# Patient Record
Sex: FEMALE | Race: WHITE | Employment: STUDENT | URBAN - METROPOLITAN AREA
[De-identification: names, ages, dates, MRNs, and addresses within clinical notes are randomized per-mention and may not be internally consistent; named-entity substitution may affect disease eponyms.]

---

## 2017-08-14 ENCOUNTER — TELEPHONE (OUTPATIENT)
Dept: OPTOMETRY | Facility: CLINIC | Age: 22
End: 2017-08-14

## 2017-08-14 NOTE — TELEPHONE ENCOUNTER
Call to verify contacts   Record #: [id number]  Rx Brand of contacts: Acuvue oasys 12 pk  QTY: 1   Right eye power numbers: 8.4 14.0 -2.50  Rx brand of contacts: Acuvue oasys 12 pk  QTY: 1box  Left eye power numbers: 8.4 14.0 -2.75  Fax number: [phone]  Contact info: Tom Prasnath [phone]  Date the prescription was written: 8/5/15  The expiration date the provider entered for that RX: 8/5/17  The provider that wrote the RX: Jose Barrera OD  Pt Address verified as matched   Fax verification denial on August 14, 2017  at 9:07 AM  Melinda KENNEDY [facility]

## 2017-08-28 ENCOUNTER — TELEPHONE (OUTPATIENT)
Dept: OPTOMETRY | Facility: CLINIC | Age: 22
End: 2017-08-28

## 2017-08-28 NOTE — TELEPHONE ENCOUNTER
Call to verify contacts   Record #: [id number]  Rx Brand of contacts: Acuvue oasys  QTY: 1 box  Right eye power numbers: 8.4 14.0 -2.75  Rx brand of contacts: Acuvue oasys  QTY: 1 box  Left eye power numbers: 8.4 14.0 -2.50  Fax number: [phone]  Contact info:   Date the prescription was written: 8/5/2015  The expiration date the provider entered for that RX: 8/5/2017  The provider that wrote the RX: Jose Barrera OD  Pt Address verified as matched   Fax verification on August 28, 2017  at 7:34 AM  Melinda KENNEDY [facility]

## 2018-12-12 ENCOUNTER — OFFICE VISIT (OUTPATIENT)
Dept: OPTOMETRY | Facility: CLINIC | Age: 23
End: 2018-12-12
Payer: COMMERCIAL

## 2018-12-12 DIAGNOSIS — H52.13 MYOPIA OF BOTH EYES: Primary | ICD-10-CM

## 2018-12-12 PROCEDURE — 92004 COMPRE OPH EXAM NEW PT 1/>: CPT | Performed by: OPTOMETRIST

## 2018-12-12 PROCEDURE — 92310 CONTACT LENS FITTING OU: CPT | Performed by: OPTOMETRIST

## 2018-12-12 PROCEDURE — 92015 DETERMINE REFRACTIVE STATE: CPT | Performed by: OPTOMETRIST

## 2018-12-12 RX ORDER — DEXTROAMPHETAMINE SACCHARATE, AMPHETAMINE ASPARTATE MONOHYDRATE, DEXTROAMPHETAMINE SULFATE AND AMPHETAMINE SULFATE 5; 5; 5; 5 MG/1; MG/1; MG/1; MG/1
20 CAPSULE, EXTENDED RELEASE ORAL DAILY
COMMUNITY

## 2018-12-12 ASSESSMENT — EXTERNAL EXAM - RIGHT EYE: OD_EXAM: NORMAL

## 2018-12-12 ASSESSMENT — SLIT LAMP EXAM - LIDS
COMMENTS: NORMAL
COMMENTS: NORMAL

## 2018-12-12 ASSESSMENT — REFRACTION_MANIFEST
OD_SPHERE: -2.50
OS_SPHERE: -3.25

## 2018-12-12 ASSESSMENT — KERATOMETRY
OS_AXISANGLE_DEGREES: 105
OD_AXISANGLE2_DEGREES: 005
OS_AXISANGLE2_DEGREES: 015
OD_AXISANGLE_DEGREES: 095
OD_K2POWER_DIOPTERS: 44.00
OD_K1POWER_DIOPTERS: 42.62
METHOD_AUTO_MANUAL: AUTOMATED
OS_K2POWER_DIOPTERS: 44.62
OS_K1POWER_DIOPTERS: 42.37

## 2018-12-12 ASSESSMENT — CUP TO DISC RATIO
OS_RATIO: 0.2
OD_RATIO: 0.2

## 2018-12-12 ASSESSMENT — TONOMETRY
OD_IOP_MMHG: 20
OS_IOP_MMHG: 20
IOP_METHOD: TONOPEN

## 2018-12-12 ASSESSMENT — REFRACTION_CURRENTRX
OS_DIAMETER: 14.0
OS_SPHERE: -2.75
OS_BRAND: ACUVUE OASYS
OD_DIAMETER: 14.0
OS_BASECURVE: 8.40
OD_BASECURVE: 8.40
OD_SPHERE: -2.50
OD_BRAND: ACUVUE OASYS

## 2018-12-12 ASSESSMENT — VISUAL ACUITY
METHOD: SNELLEN - LINEAR
CORRECTION_TYPE: CONTACTS
OD_SC: 20/100
OS_CC: 20/25
OS_CC: 20/25
OS_SC: 20/80
OD_CC: 20/20
OD_CC: 20/20
OS_CC+: -3

## 2018-12-12 ASSESSMENT — EXTERNAL EXAM - LEFT EYE: OS_EXAM: NORMAL

## 2018-12-12 ASSESSMENT — REFRACTION_WEARINGRX
OS_SPHERE: -2.75
OD_SPHERE: -2.50
OS_CYLINDER: SPHERE
OD_CYLINDER: SPHERE

## 2018-12-12 ASSESSMENT — CONF VISUAL FIELD
OD_NORMAL: 1
METHOD: COUNTING FINGERS
OS_NORMAL: 1

## 2018-12-12 NOTE — PROGRESS NOTES
"Chief Complaint   Patient presents with     Annual Eye Exam        Previous contact lens wearer? Yes: Eliud ramsey  - changes every 2 weeks  Comfort of contact lenses :\"for the most part\" in the winter eyes are dry  Satisfied with current lenses: Yes        Last Eye Exam: 2015  Dilated Previously: Yes    What are you currently using to see?  glasses and contacts    Distance Vision Acuity: Satisfied with vision    Near Vision Acuity: Satisfied with vision while reading  with glasses/contacts    Eye Comfort: dry and redness especially in the winter  Do you use eye drops? 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       OBJECTIVE: See Ophthalmology exam    ASSESSMENT:    ICD-10-CM    1. Myopia of both eyes H52.13       PLAN:     Patient Instructions   Eyeglass prescription given.    Dispensed trial contacts.  Ok to order if satisfied.    Return in 1 year for a complete eye exam or sooner if needed.    Jose Barrera, OD                       "

## 2018-12-12 NOTE — PATIENT INSTRUCTIONS
Eyeglass prescription given.    Dispensed trial contacts.  Ok to order if satisfied.    Return in 1 year for a complete eye exam or sooner if needed.    Jose Barrera, JONATAN    The affects of the dilating drops last for 4- 6 hours.  You will be more sensitive to light and vision will be blurry up close.  Mydriatic sunglasses were given if needed.      Optometry Providers       Clinic Locations                                 Telephone Number   Dr. Magui Mejia [facility] [place]   [place] [phone]     [facility] Hours:                [place] [facility] Hours:       [place] [facility] Hours:   [address]   [address]      [address] [place] [address]   [place] [address]    [place] [address]  Phone: [phone]                    Phone: [phone]     Phone: [phone]                      Monday 8:00-7:00                          Monday 8:00-7:00                          Monday 8:00-7:00              Tuesday 8:00-6:00                          Tuesday 8:00-7:00                          Tuesday 8:00-7:00              Wednesday 8:00-6:00                  Wednesday 8:00-7:00                   Wednesday 8:00-7:00      Thursday 8:00-6:00                        Thursday 8:00-7:00                         Thursday 8:00-7:00            Friday 8:00-5:00                              Friday 8:00-5:00                              Friday 8:00-5:00    [facility] Hours:   [address] [place] [address]  [phone]    Monday 8:00-7:00  Tuesday 8:00-7:00  Wednesday 8:00-7:00  Thursday 8:00-7:00  Friday 8:00-5:00  Please log on to [email or web address] to order your contact lenses.  The link is found on the [facility] page.  As always, Thank you for trusting us with your health care needs!

## 2019-01-07 ENCOUNTER — TELEPHONE (OUTPATIENT)
Dept: OPHTHALMOLOGY | Facility: CLINIC | Age: 24
End: 2019-01-07

## 2019-01-07 NOTE — TELEPHONE ENCOUNTER
Call to verify contacts   Record #: [id number]  Rx Brand of contacts: Acuvue oasys   QTY: 1 12 pk  Right eye power numbers: 8.4 14.0  -2.50  Rx brand of contacts: Acuvue oasys  QTY: 1 12 pk  Left eye power numbers: 8.4 14.0 -3.25  Fax number: [phone]  Contact info: [phone]  Date the prescription was written: 12/12/2018  The expiration date the provider entered for that RX: 12/12/2020  The provider that wrote the RX: Jose Barrera OD  Pt Address verified as matched   Fax verification on January 7, 2019  at 12:43 PM  Melinda KENNEDY [facility]

## 2019-10-04 ENCOUNTER — TELEPHONE (OUTPATIENT)
Dept: OPTOMETRY | Facility: CLINIC | Age: 24
End: 2019-10-04

## 2019-10-04 NOTE — TELEPHONE ENCOUNTER
[facility] Call Center    Phone Message    May a detailed message be left on voicemail: yes    Reason for Call: Other: Patient would like a copy of her contact lens prescription faxed to [phone] [employer].      Action Taken: Message routed to:  Adult Clinics: Eye p [phone]

## 2019-11-13 ENCOUNTER — TELEPHONE (OUTPATIENT)
Dept: OPTOMETRY | Facility: CLINIC | Age: 24
End: 2019-11-13

## 2019-11-13 NOTE — TELEPHONE ENCOUNTER
11/13 Instructed patient to call [phone] to schedule yearly eye exam around 12/12/2019.    Asha An   Procedure    Ortho/Sports Med/Ent/Eye   [facility] [place] [facility]   [phone]

## 2019-11-15 ENCOUNTER — TELEPHONE (OUTPATIENT)
Dept: OPTOMETRY | Facility: CLINIC | Age: 24
End: 2019-11-15

## 2019-11-15 NOTE — TELEPHONE ENCOUNTER
11/15 Instructed patient to call [phone] to schedule a follow up (around 12/12/2019) for Annual Visit.    Asha An   Procedure    Ortho/Sports Med/Ent/Eye   [facility]   [phone]

## 2019-11-21 ENCOUNTER — TELEPHONE (OUTPATIENT)
Dept: OPTOMETRY | Facility: CLINIC | Age: 24
End: 2019-11-21

## 2019-11-21 NOTE — TELEPHONE ENCOUNTER
11/21 3rd attempt, provided phone number [phone] to schedule yearly eye exam around 12/12/19.    Asha An   Procedure    Ortho/Sports Med/Ent/Eye   [facility]   [phone]